# Patient Record
Sex: FEMALE | Race: BLACK OR AFRICAN AMERICAN | NOT HISPANIC OR LATINO | Employment: FULL TIME | URBAN - METROPOLITAN AREA
[De-identification: names, ages, dates, MRNs, and addresses within clinical notes are randomized per-mention and may not be internally consistent; named-entity substitution may affect disease eponyms.]

---

## 2023-08-07 ENCOUNTER — APPOINTMENT (OUTPATIENT)
Dept: RADIOLOGY | Facility: MEDICAL CENTER | Age: 58
End: 2023-08-07
Attending: EMERGENCY MEDICINE
Payer: COMMERCIAL

## 2023-08-07 ENCOUNTER — HOSPITAL ENCOUNTER (EMERGENCY)
Facility: MEDICAL CENTER | Age: 58
End: 2023-08-07
Attending: EMERGENCY MEDICINE
Payer: COMMERCIAL

## 2023-08-07 VITALS
DIASTOLIC BLOOD PRESSURE: 81 MMHG | BODY MASS INDEX: 35.57 KG/M2 | RESPIRATION RATE: 16 BRPM | SYSTOLIC BLOOD PRESSURE: 134 MMHG | HEART RATE: 88 BPM | OXYGEN SATURATION: 97 % | HEIGHT: 67 IN | TEMPERATURE: 98.7 F | WEIGHT: 226.63 LBS

## 2023-08-07 DIAGNOSIS — M79.671 FOOT PAIN, RIGHT: ICD-10-CM

## 2023-08-07 DIAGNOSIS — S93.401A SPRAIN OF RIGHT ANKLE, UNSPECIFIED LIGAMENT, INITIAL ENCOUNTER: ICD-10-CM

## 2023-08-07 DIAGNOSIS — S93.601A SPRAIN OF RIGHT FOOT, INITIAL ENCOUNTER: ICD-10-CM

## 2023-08-07 PROCEDURE — 73630 X-RAY EXAM OF FOOT: CPT | Mod: RT

## 2023-08-07 PROCEDURE — 99284 EMERGENCY DEPT VISIT MOD MDM: CPT

## 2023-08-07 PROCEDURE — 73610 X-RAY EXAM OF ANKLE: CPT | Mod: RT

## 2023-08-07 PROCEDURE — 73700 CT LOWER EXTREMITY W/O DYE: CPT | Mod: RT

## 2023-08-08 NOTE — ED PROVIDER NOTES
"  ER Provider Note    Scribed for Mahamed Jensen Ii, M.d. by Kennedi Shepherd. 8/7/2023  8:14 PM    Primary Care Provider: No primary care provider noted.    CHIEF COMPLAINT  Chief Complaint   Patient presents with    Foot Pain     Pt presents with Right foot pain that started on the 28th of July. Pt states was zip-lining and struck the top of her foot on the landing pad of the zip-line. Pt has slight bruising on foot and mild ankle swelling.      EXTERNAL RECORDS REVIEWED  Other No records available for review.     HPI/ROS  LIMITATION TO HISTORY   Select: : None  OUTSIDE HISTORIAN(S):  None.    Albina Taylor is a 57 y.o. female who presents to the ED for evaluation of right foot pain onset 10 days ago. She describes that she was zip-lining and struck the top of her right foot on the landing pad of the zip-line. The patient states she also has slight bruising, and mild right ankle swelling, but denies any right leg pain. She states that she has been traveling for the past few months and is getting ready to travel again. She is in [place] for work.  Traveling to [place] in a few days.     PAST MEDICAL HISTORY  History reviewed. No pertinent past medical history.    SURGICAL HISTORY  History reviewed. No pertinent surgical history.    FAMILY HISTORY  History reviewed. No pertinent family history.    SOCIAL HISTORY   reports that she has never smoked. She has never used smokeless tobacco. She reports current alcohol use. She reports that she does not use drugs.    CURRENT MEDICATIONS  No current outpatient medications     ALLERGIES  Penicillins    PHYSICAL EXAM  /78   Pulse 88   Temp 36.8 °C (98.3 °F) (Temporal)   Resp 16   Ht 1.702 m (5' 7\")   Wt 103 kg (226 lb 10.1 oz)   SpO2 95%   BMI 35.50 kg/m²     Physical Exam  Vitals and nursing note reviewed.   Musculoskeletal:      Comments: Tenderness and edema at lateral foot and lateral malleolus. Warm, no neuro vascular deficits. No calf " tenderness. Compartments soft   Neurological:      Mental Status: She is alert.        DIAGNOSTIC STUDIES    Radiology:   The attending emergency physician has independently interpreted the diagnostic imaging associated with this visit and am waiting the final reading from the radiologist.   Preliminary interpretation is a follows: no obvious fracture  Radiologist interpretation:   CT-FOOT W/O PLUS RECONS RIGHT   Final Result      No acute fracture or malalignment of the foot.      DX-FOOT-COMPLETE 3+ RIGHT   Final Result      Possible nondisplaced fracture of the first metatarsal. Correlate for point tenderness in this region.      DX-ANKLE 3+ VIEWS RIGHT   Final Result      1.  No definite acute fracture or malalignment.   2.  Diffuse ankle soft tissue swelling.   3.  Well corticated appearing fragment projecting adjacent to the medial malleolus likely chronic posttraumatic change.         COURSE & MEDICAL DECISION MAKING     ED Observation Status? No; Patient does not meet criteria for ED Observation.     INITIAL ASSESSMENT, COURSE AND PLAN  Care Narrative:     8:15 PM - Patient seen and examined at bedside. This is a 57 year old female who presents for evaluation of her right foot pain onset 10 days ago after hitting the top of her foot zip-lining. Discussed plan of care, including performing imaging. Patient agrees to the plan of care. Ordered for DX-Foot (Right) and DX-Ankle (Right)  to evaluate her symptoms.      9:02 PM - Patient was reevaluated at bedside. No obvious fracture but she does have edema and significant tenderness. Will order a CT of the foot to evaluate for occult fractures.     CT did not show any fracture      PROBLEM LIST  #Right foot sprain   -CT without any evidence of fracture   -discussed compression, rest, follow up with orthopedic clinic if pain persists.     DISPOSITION AND DISCUSSION    Barriers to care at this time, including but not limited to: Patient does not have established  PCP.         FINAL DIAGNOSIS  1. Foot pain, right    2. Sprain of right foot, initial encounter    3. Sprain of right ankle, unspecified ligament, initial encounter       I, Kennedi Shepherd (Scribe), am scribing for, and in the presence of, JUANCARLOS English II.    Electronically signed by: Kennedi Shepherd (Scribe), 8/7/2023    IMahamed II, M* personally performed the services described in this documentation, as scribed by Kennedi Shepherd in my presence, and it is both accurate and complete.      The note accurately reflects work and decisions made by me.  Mahamed Jensen II, M.D.  8/8/2023  12:04 AM

## 2023-08-08 NOTE — ED NOTES
Patient provided discharge instructions, verbalizes understanding and denies any further questions. Patient instructed to return if condition worsens. Patient ambulated to the front lobby with all belongings.

## 2023-08-08 NOTE — ED NOTES
Pt ambulated to purple 70 with a wide based shuffle. Pt connected to the monitor. Bed locked and in lowest position. Call light within reach. ERP to see.

## 2023-08-08 NOTE — ED TRIAGE NOTES
"Chief Complaint   Patient presents with    Foot Pain     Pt presents with Right foot pain that started on the 28th of July. Pt states was zip-lining and struck the top of her foot on the landing pad of the zip-line. Pt has slight bruising on foot and mild ankle swelling.        Pt ambulatory to triage. Pt A&Ox4, for above complaint.     Pt to lobby . Pt educated on alerting staff in changes to condition. Pt verbalized understanding.     /78   Pulse 88   Temp 36.8 °C (98.3 °F) (Temporal)   Resp 16   Ht 1.702 m (5' 7\")   Wt 103 kg (226 lb 10.1 oz)   SpO2 95%   BMI 35.50 kg/m²   "